# Patient Record
Sex: FEMALE | Race: WHITE | NOT HISPANIC OR LATINO | URBAN - METROPOLITAN AREA
[De-identification: names, ages, dates, MRNs, and addresses within clinical notes are randomized per-mention and may not be internally consistent; named-entity substitution may affect disease eponyms.]

---

## 2022-10-18 ENCOUNTER — APPOINTMENT (RX ONLY)
Dept: URBAN - METROPOLITAN AREA CLINIC 70 | Facility: CLINIC | Age: 27
Setting detail: DERMATOLOGY
End: 2022-10-18

## 2022-10-18 DIAGNOSIS — L90.5 SCAR CONDITIONS AND FIBROSIS OF SKIN: ICD-10-CM

## 2022-10-18 DIAGNOSIS — R21 RASH AND OTHER NONSPECIFIC SKIN ERUPTION: ICD-10-CM

## 2022-10-18 PROCEDURE — ? ORDER TESTS

## 2022-10-18 PROCEDURE — 99204 OFFICE O/P NEW MOD 45 MIN: CPT

## 2022-10-18 PROCEDURE — ? COUNSELING

## 2022-10-18 PROCEDURE — ? PRESCRIPTION

## 2022-10-18 RX ORDER — AMMONIUM LACTATE 12 G/100G
LOTION TOPICAL
Qty: 400 | Refills: 5 | Status: ERX | COMMUNITY
Start: 2022-10-18

## 2022-10-18 RX ADMIN — AMMONIUM LACTATE: 12 LOTION TOPICAL at 00:00

## 2022-10-18 NOTE — PROCEDURE: ORDER TESTS
Expected Date Of Service: 10/18/2022
Performing Laboratory: 0
Billing Type: Third-Party Bill
Bill For Surgical Tray: no

## 2022-10-18 NOTE — HPI: SKIN LESIONS
How Severe Is Your Skin Lesion?: mild
Have Your Skin Lesions Been Treated?: not been treated
Is This A New Presentation, Or A Follow-Up?: Skin Lesions
Additional History: Scarring very easily.